# Patient Record
Sex: FEMALE | Race: ASIAN | URBAN - METROPOLITAN AREA
[De-identification: names, ages, dates, MRNs, and addresses within clinical notes are randomized per-mention and may not be internally consistent; named-entity substitution may affect disease eponyms.]

---

## 2021-10-29 ENCOUNTER — OFFICE VISIT (OUTPATIENT)
Dept: URBAN - METROPOLITAN AREA CLINIC 72 | Facility: CLINIC | Age: 46
End: 2021-10-29
Payer: COMMERCIAL

## 2021-10-29 DIAGNOSIS — H52.4 PRESBYOPIA: Primary | ICD-10-CM

## 2021-10-29 PROCEDURE — 92002 INTRM OPH EXAM NEW PATIENT: CPT | Performed by: OPTOMETRIST

## 2021-10-29 ASSESSMENT — INTRAOCULAR PRESSURE
OS: 16
OD: 17

## 2021-10-29 ASSESSMENT — VISUAL ACUITY
OD: 20/20
OS: 20/20

## 2021-10-29 NOTE — IMPRESSION/PLAN
Impression: Presbyopia: H52.4. Plan: Do not recommend glasses.  
Recommend pt to use OTC cheaters, +1.00 +1.25

## 2022-12-02 ENCOUNTER — OFFICE VISIT (OUTPATIENT)
Dept: URBAN - METROPOLITAN AREA CLINIC 72 | Facility: CLINIC | Age: 47
End: 2022-12-02
Payer: COMMERCIAL

## 2022-12-02 DIAGNOSIS — H25.013 CORTICAL AGE-RELATED CATARACT, BILATERAL: ICD-10-CM

## 2022-12-02 DIAGNOSIS — H52.4 PRESBYOPIA: Primary | ICD-10-CM

## 2022-12-02 PROCEDURE — 92014 COMPRE OPH EXAM EST PT 1/>: CPT | Performed by: OPTOMETRIST

## 2022-12-02 ASSESSMENT — INTRAOCULAR PRESSURE
OD: 20
OS: 18

## 2022-12-02 ASSESSMENT — VISUAL ACUITY
OS: 20/20
OD: 20/20